# Patient Record
Sex: MALE | Race: WHITE | NOT HISPANIC OR LATINO | URBAN - METROPOLITAN AREA
[De-identification: names, ages, dates, MRNs, and addresses within clinical notes are randomized per-mention and may not be internally consistent; named-entity substitution may affect disease eponyms.]

---

## 2015-07-09 RX ORDER — INSULIN GLARGINE 100 [IU]/ML
60 INJECTION, SOLUTION SUBCUTANEOUS
Qty: 0 | Refills: 0 | COMMUNITY
Start: 2015-07-09

## 2019-03-08 ENCOUNTER — INPATIENT (INPATIENT)
Facility: HOSPITAL | Age: 74
LOS: 3 days | Discharge: HOME | DRG: 638 | End: 2019-03-12
Attending: STUDENT IN AN ORGANIZED HEALTH CARE EDUCATION/TRAINING PROGRAM | Admitting: STUDENT IN AN ORGANIZED HEALTH CARE EDUCATION/TRAINING PROGRAM
Payer: COMMERCIAL

## 2019-03-08 VITALS
HEIGHT: 72 IN | DIASTOLIC BLOOD PRESSURE: 80 MMHG | RESPIRATION RATE: 16 BRPM | SYSTOLIC BLOOD PRESSURE: 116 MMHG | WEIGHT: 300.05 LBS | HEART RATE: 82 BPM | TEMPERATURE: 98 F | OXYGEN SATURATION: 93 %

## 2019-03-08 RX ORDER — ONDANSETRON 8 MG/1
4 TABLET, FILM COATED ORAL ONCE
Qty: 0 | Refills: 0 | Status: COMPLETED | OUTPATIENT
Start: 2019-03-08 | End: 2019-03-08

## 2019-03-08 RX ORDER — SODIUM CHLORIDE 9 MG/ML
1000 INJECTION INTRAMUSCULAR; INTRAVENOUS; SUBCUTANEOUS ONCE
Qty: 0 | Refills: 0 | Status: COMPLETED | OUTPATIENT
Start: 2019-03-08 | End: 2019-03-08

## 2019-03-09 DIAGNOSIS — E11.65 TYPE 2 DIABETES MELLITUS WITH HYPERGLYCEMIA: ICD-10-CM

## 2019-03-09 DIAGNOSIS — Z29.9 ENCOUNTER FOR PROPHYLACTIC MEASURES, UNSPECIFIED: ICD-10-CM

## 2019-03-09 DIAGNOSIS — K21.9 GASTRO-ESOPHAGEAL REFLUX DISEASE WITHOUT ESOPHAGITIS: ICD-10-CM

## 2019-03-09 DIAGNOSIS — N17.9 ACUTE KIDNEY FAILURE, UNSPECIFIED: ICD-10-CM

## 2019-03-09 DIAGNOSIS — E11.43 TYPE 2 DIABETES MELLITUS WITH DIABETIC AUTONOMIC (POLY)NEUROPATHY: ICD-10-CM

## 2019-03-09 DIAGNOSIS — I10 ESSENTIAL (PRIMARY) HYPERTENSION: ICD-10-CM

## 2019-03-09 DIAGNOSIS — F03.90 UNSPECIFIED DEMENTIA WITHOUT BEHAVIORAL DISTURBANCE: ICD-10-CM

## 2019-03-09 DIAGNOSIS — I63.9 CEREBRAL INFARCTION, UNSPECIFIED: ICD-10-CM

## 2019-03-09 LAB
ALBUMIN SERPL ELPH-MCNC: 3.6 G/DL — SIGNIFICANT CHANGE UP (ref 3.5–5)
ALP SERPL-CCNC: 74 U/L — SIGNIFICANT CHANGE UP (ref 40–120)
ALT FLD-CCNC: 25 U/L DA — SIGNIFICANT CHANGE UP (ref 10–60)
ANION GAP SERPL CALC-SCNC: 7 MMOL/L — SIGNIFICANT CHANGE UP (ref 5–17)
ANION GAP SERPL CALC-SCNC: 9 MMOL/L — SIGNIFICANT CHANGE UP (ref 5–17)
APPEARANCE UR: CLEAR — SIGNIFICANT CHANGE UP
APTT BLD: 38 SEC — HIGH (ref 27.5–36.3)
AST SERPL-CCNC: 16 U/L — SIGNIFICANT CHANGE UP (ref 10–40)
BACTERIA # UR AUTO: ABNORMAL /HPF
BASOPHILS # BLD AUTO: 0.04 K/UL — SIGNIFICANT CHANGE UP (ref 0–0.2)
BASOPHILS NFR BLD AUTO: 0.6 % — SIGNIFICANT CHANGE UP (ref 0–2)
BILIRUB DIRECT SERPL-MCNC: 0.1 MG/DL — SIGNIFICANT CHANGE UP (ref 0–0.2)
BILIRUB INDIRECT FLD-MCNC: 0.4 MG/DL — SIGNIFICANT CHANGE UP (ref 0.2–1)
BILIRUB SERPL-MCNC: 0.5 MG/DL — SIGNIFICANT CHANGE UP (ref 0.2–1.2)
BILIRUB SERPL-MCNC: 0.5 MG/DL — SIGNIFICANT CHANGE UP (ref 0.2–1.2)
BILIRUB UR-MCNC: NEGATIVE — SIGNIFICANT CHANGE UP
BUN SERPL-MCNC: 38 MG/DL — HIGH (ref 7–18)
BUN SERPL-MCNC: 43 MG/DL — HIGH (ref 7–18)
CALCIUM SERPL-MCNC: 8.4 MG/DL — SIGNIFICANT CHANGE UP (ref 8.4–10.5)
CALCIUM SERPL-MCNC: 8.6 MG/DL — SIGNIFICANT CHANGE UP (ref 8.4–10.5)
CHLORIDE SERPL-SCNC: 103 MMOL/L — SIGNIFICANT CHANGE UP (ref 96–108)
CHLORIDE SERPL-SCNC: 111 MMOL/L — HIGH (ref 96–108)
CO2 SERPL-SCNC: 22 MMOL/L — SIGNIFICANT CHANGE UP (ref 22–31)
CO2 SERPL-SCNC: 23 MMOL/L — SIGNIFICANT CHANGE UP (ref 22–31)
COLOR SPEC: YELLOW — SIGNIFICANT CHANGE UP
CREAT SERPL-MCNC: 1.66 MG/DL — HIGH (ref 0.5–1.3)
CREAT SERPL-MCNC: 1.96 MG/DL — HIGH (ref 0.5–1.3)
DIFF PNL FLD: NEGATIVE — SIGNIFICANT CHANGE UP
EOSINOPHIL # BLD AUTO: 0.14 K/UL — SIGNIFICANT CHANGE UP (ref 0–0.5)
EOSINOPHIL NFR BLD AUTO: 2.1 % — SIGNIFICANT CHANGE UP (ref 0–6)
EPI CELLS # UR: SIGNIFICANT CHANGE UP /HPF
GLUCOSE SERPL-MCNC: 387 MG/DL — HIGH (ref 70–99)
GLUCOSE SERPL-MCNC: 580 MG/DL — CRITICAL HIGH (ref 70–99)
GLUCOSE UR QL: 1000 MG/DL
HCT VFR BLD CALC: 42.3 % — SIGNIFICANT CHANGE UP (ref 39–50)
HCV AB S/CO SERPL IA: 0.14 S/CO — SIGNIFICANT CHANGE UP (ref 0–0.79)
HCV AB SERPL-IMP: SIGNIFICANT CHANGE UP
HGB BLD-MCNC: 14.3 G/DL — SIGNIFICANT CHANGE UP (ref 13–17)
IMM GRANULOCYTES NFR BLD AUTO: 0.1 % — SIGNIFICANT CHANGE UP (ref 0–1.5)
INR BLD: 1.1 RATIO — SIGNIFICANT CHANGE UP (ref 0.88–1.16)
KETONES UR-MCNC: NEGATIVE — SIGNIFICANT CHANGE UP
LACTATE SERPL-SCNC: 1 MMOL/L — SIGNIFICANT CHANGE UP (ref 0.7–2)
LEUKOCYTE ESTERASE UR-ACNC: NEGATIVE — SIGNIFICANT CHANGE UP
LIDOCAIN IGE QN: 133 U/L — SIGNIFICANT CHANGE UP (ref 73–393)
LYMPHOCYTES # BLD AUTO: 2.02 K/UL — SIGNIFICANT CHANGE UP (ref 1–3.3)
LYMPHOCYTES # BLD AUTO: 30.3 % — SIGNIFICANT CHANGE UP (ref 13–44)
MAGNESIUM SERPL-MCNC: 2.5 MG/DL — SIGNIFICANT CHANGE UP (ref 1.6–2.6)
MCHC RBC-ENTMCNC: 27.7 PG — SIGNIFICANT CHANGE UP (ref 27–34)
MCHC RBC-ENTMCNC: 33.8 GM/DL — SIGNIFICANT CHANGE UP (ref 32–36)
MCV RBC AUTO: 81.8 FL — SIGNIFICANT CHANGE UP (ref 80–100)
MONOCYTES # BLD AUTO: 0.48 K/UL — SIGNIFICANT CHANGE UP (ref 0–0.9)
MONOCYTES NFR BLD AUTO: 7.2 % — SIGNIFICANT CHANGE UP (ref 2–14)
NEUTROPHILS # BLD AUTO: 3.98 K/UL — SIGNIFICANT CHANGE UP (ref 1.8–7.4)
NEUTROPHILS NFR BLD AUTO: 59.7 % — SIGNIFICANT CHANGE UP (ref 43–77)
NITRITE UR-MCNC: NEGATIVE — SIGNIFICANT CHANGE UP
NRBC # BLD: 0 /100 WBCS — SIGNIFICANT CHANGE UP (ref 0–0)
PH UR: 5 — SIGNIFICANT CHANGE UP (ref 5–8)
PHOSPHATE SERPL-MCNC: 3.2 MG/DL — SIGNIFICANT CHANGE UP (ref 2.5–4.5)
PLATELET # BLD AUTO: 162 K/UL — SIGNIFICANT CHANGE UP (ref 150–400)
POTASSIUM SERPL-MCNC: 3.8 MMOL/L — SIGNIFICANT CHANGE UP (ref 3.5–5.3)
POTASSIUM SERPL-MCNC: 4.1 MMOL/L — SIGNIFICANT CHANGE UP (ref 3.5–5.3)
POTASSIUM SERPL-SCNC: 3.8 MMOL/L — SIGNIFICANT CHANGE UP (ref 3.5–5.3)
POTASSIUM SERPL-SCNC: 4.1 MMOL/L — SIGNIFICANT CHANGE UP (ref 3.5–5.3)
PROT SERPL-MCNC: 7.6 G/DL — SIGNIFICANT CHANGE UP (ref 6–8.3)
PROT UR-MCNC: 15
PROTHROM AB SERPL-ACNC: 12.3 SEC — SIGNIFICANT CHANGE UP (ref 10–12.9)
RBC # BLD: 5.17 M/UL — SIGNIFICANT CHANGE UP (ref 4.2–5.8)
RBC # FLD: 13.3 % — SIGNIFICANT CHANGE UP (ref 10.3–14.5)
RBC CASTS # UR COMP ASSIST: SIGNIFICANT CHANGE UP /HPF (ref 0–2)
SODIUM SERPL-SCNC: 135 MMOL/L — SIGNIFICANT CHANGE UP (ref 135–145)
SODIUM SERPL-SCNC: 140 MMOL/L — SIGNIFICANT CHANGE UP (ref 135–145)
SP GR SPEC: 1.01 — SIGNIFICANT CHANGE UP (ref 1.01–1.02)
TROPONIN I SERPL-MCNC: <0.015 NG/ML — SIGNIFICANT CHANGE UP (ref 0–0.04)
UROBILINOGEN FLD QL: NEGATIVE — SIGNIFICANT CHANGE UP
WBC # BLD: 6.67 K/UL — SIGNIFICANT CHANGE UP (ref 3.8–10.5)
WBC # FLD AUTO: 6.67 K/UL — SIGNIFICANT CHANGE UP (ref 3.8–10.5)
WBC UR QL: SIGNIFICANT CHANGE UP /HPF (ref 0–5)

## 2019-03-09 PROCEDURE — 99285 EMERGENCY DEPT VISIT HI MDM: CPT

## 2019-03-09 PROCEDURE — 99223 1ST HOSP IP/OBS HIGH 75: CPT

## 2019-03-09 PROCEDURE — 74176 CT ABD & PELVIS W/O CONTRAST: CPT | Mod: 26

## 2019-03-09 RX ORDER — ATORVASTATIN CALCIUM 80 MG/1
40 TABLET, FILM COATED ORAL AT BEDTIME
Qty: 0 | Refills: 0 | Status: DISCONTINUED | OUTPATIENT
Start: 2019-03-09 | End: 2019-03-12

## 2019-03-09 RX ORDER — CARVEDILOL PHOSPHATE 80 MG/1
25 CAPSULE, EXTENDED RELEASE ORAL EVERY 12 HOURS
Qty: 0 | Refills: 0 | Status: DISCONTINUED | OUTPATIENT
Start: 2019-03-09 | End: 2019-03-12

## 2019-03-09 RX ORDER — METOCLOPRAMIDE HCL 10 MG
10 TABLET ORAL EVERY 6 HOURS
Qty: 0 | Refills: 0 | Status: DISCONTINUED | OUTPATIENT
Start: 2019-03-09 | End: 2019-03-12

## 2019-03-09 RX ORDER — DOCUSATE SODIUM 100 MG
100 CAPSULE ORAL
Qty: 0 | Refills: 0 | Status: DISCONTINUED | OUTPATIENT
Start: 2019-03-09 | End: 2019-03-12

## 2019-03-09 RX ORDER — SODIUM CHLORIDE 9 MG/ML
1000 INJECTION INTRAMUSCULAR; INTRAVENOUS; SUBCUTANEOUS ONCE
Qty: 0 | Refills: 0 | Status: COMPLETED | OUTPATIENT
Start: 2019-03-09 | End: 2019-03-09

## 2019-03-09 RX ORDER — POLYETHYLENE GLYCOL 3350 17 G/17G
17 POWDER, FOR SOLUTION ORAL DAILY
Qty: 0 | Refills: 0 | Status: COMPLETED | OUTPATIENT
Start: 2019-03-09 | End: 2019-03-10

## 2019-03-09 RX ORDER — QUETIAPINE FUMARATE 200 MG/1
50 TABLET, FILM COATED ORAL
Qty: 0 | Refills: 0 | Status: DISCONTINUED | OUTPATIENT
Start: 2019-03-09 | End: 2019-03-12

## 2019-03-09 RX ORDER — QUETIAPINE FUMARATE 200 MG/1
1 TABLET, FILM COATED ORAL
Qty: 0 | Refills: 0 | COMMUNITY

## 2019-03-09 RX ORDER — ASPIRIN/CALCIUM CARB/MAGNESIUM 324 MG
81 TABLET ORAL DAILY
Qty: 0 | Refills: 0 | Status: DISCONTINUED | OUTPATIENT
Start: 2019-03-09 | End: 2019-03-12

## 2019-03-09 RX ORDER — SENNA PLUS 8.6 MG/1
2 TABLET ORAL AT BEDTIME
Qty: 0 | Refills: 0 | Status: DISCONTINUED | OUTPATIENT
Start: 2019-03-09 | End: 2019-03-12

## 2019-03-09 RX ORDER — INSULIN LISPRO 100/ML
VIAL (ML) SUBCUTANEOUS EVERY 4 HOURS
Qty: 0 | Refills: 0 | Status: DISCONTINUED | OUTPATIENT
Start: 2019-03-09 | End: 2019-03-10

## 2019-03-09 RX ORDER — APIXABAN 2.5 MG/1
5 TABLET, FILM COATED ORAL EVERY 12 HOURS
Qty: 0 | Refills: 0 | Status: DISCONTINUED | OUTPATIENT
Start: 2019-03-09 | End: 2019-03-11

## 2019-03-09 RX ORDER — PANTOPRAZOLE SODIUM 20 MG/1
40 TABLET, DELAYED RELEASE ORAL
Qty: 0 | Refills: 0 | Status: DISCONTINUED | OUTPATIENT
Start: 2019-03-09 | End: 2019-03-12

## 2019-03-09 RX ORDER — INSULIN GLARGINE 100 [IU]/ML
20 INJECTION, SOLUTION SUBCUTANEOUS EVERY MORNING
Qty: 0 | Refills: 0 | Status: DISCONTINUED | OUTPATIENT
Start: 2019-03-09 | End: 2019-03-10

## 2019-03-09 RX ORDER — SODIUM CHLORIDE 9 MG/ML
1000 INJECTION INTRAMUSCULAR; INTRAVENOUS; SUBCUTANEOUS
Qty: 0 | Refills: 0 | Status: DISCONTINUED | OUTPATIENT
Start: 2019-03-09 | End: 2019-03-10

## 2019-03-09 RX ORDER — LEVOTHYROXINE SODIUM 125 MCG
175 TABLET ORAL DAILY
Qty: 0 | Refills: 0 | Status: DISCONTINUED | OUTPATIENT
Start: 2019-03-09 | End: 2019-03-12

## 2019-03-09 RX ADMIN — Medication 175 MICROGRAM(S): at 07:13

## 2019-03-09 RX ADMIN — APIXABAN 5 MILLIGRAM(S): 2.5 TABLET, FILM COATED ORAL at 17:50

## 2019-03-09 RX ADMIN — POLYETHYLENE GLYCOL 3350 17 GRAM(S): 17 POWDER, FOR SOLUTION ORAL at 11:13

## 2019-03-09 RX ADMIN — Medication 3: at 22:23

## 2019-03-09 RX ADMIN — QUETIAPINE FUMARATE 50 MILLIGRAM(S): 200 TABLET, FILM COATED ORAL at 17:50

## 2019-03-09 RX ADMIN — SODIUM CHLORIDE 1000 MILLILITER(S): 9 INJECTION INTRAMUSCULAR; INTRAVENOUS; SUBCUTANEOUS at 01:49

## 2019-03-09 RX ADMIN — Medication 6: at 11:06

## 2019-03-09 RX ADMIN — Medication 2: at 17:58

## 2019-03-09 RX ADMIN — SODIUM CHLORIDE 4000 MILLILITER(S): 9 INJECTION INTRAMUSCULAR; INTRAVENOUS; SUBCUTANEOUS at 02:34

## 2019-03-09 RX ADMIN — Medication 4: at 15:04

## 2019-03-09 RX ADMIN — SODIUM CHLORIDE 100 MILLILITER(S): 9 INJECTION INTRAMUSCULAR; INTRAVENOUS; SUBCUTANEOUS at 06:44

## 2019-03-09 RX ADMIN — Medication 100 MILLIGRAM(S): at 17:50

## 2019-03-09 RX ADMIN — ATORVASTATIN CALCIUM 40 MILLIGRAM(S): 80 TABLET, FILM COATED ORAL at 22:23

## 2019-03-09 RX ADMIN — INSULIN GLARGINE 20 UNIT(S): 100 INJECTION, SOLUTION SUBCUTANEOUS at 07:01

## 2019-03-09 RX ADMIN — SENNA PLUS 2 TABLET(S): 8.6 TABLET ORAL at 22:23

## 2019-03-09 RX ADMIN — Medication 81 MILLIGRAM(S): at 11:10

## 2019-03-09 RX ADMIN — Medication 10 MILLIGRAM(S): at 11:13

## 2019-03-09 RX ADMIN — Medication 5: at 06:01

## 2019-03-09 RX ADMIN — CARVEDILOL PHOSPHATE 25 MILLIGRAM(S): 80 CAPSULE, EXTENDED RELEASE ORAL at 17:50

## 2019-03-09 RX ADMIN — SODIUM CHLORIDE 4000 MILLILITER(S): 9 INJECTION INTRAMUSCULAR; INTRAVENOUS; SUBCUTANEOUS at 00:10

## 2019-03-09 RX ADMIN — SODIUM CHLORIDE 100 MILLILITER(S): 9 INJECTION INTRAMUSCULAR; INTRAVENOUS; SUBCUTANEOUS at 11:14

## 2019-03-09 RX ADMIN — ONDANSETRON 4 MILLIGRAM(S): 8 TABLET, FILM COATED ORAL at 00:39

## 2019-03-09 RX ADMIN — PANTOPRAZOLE SODIUM 40 MILLIGRAM(S): 20 TABLET, DELAYED RELEASE ORAL at 07:00

## 2019-03-09 NOTE — H&P ADULT - PROBLEM SELECTOR PLAN 8
Improve VTE score: 2 (Eliquis bid)  [ ] Previous VTE                                               3  [ ] Thrombophilia                                             2  [ ] Lower limb paralysis                                   2    [ ] Current Cancer                                           2   [x] Immobilization > 24 hrs                              1  [ ] ICU/CCU stay > 24 hours                            1  [x] Age > 60                                                       1

## 2019-03-09 NOTE — H&P ADULT - PROBLEM SELECTOR PLAN 1
Intractable nausea and vomiting for 2 wks   CT Abdomen: No acute pathology   Keep NPO  Started IV metoclopramide

## 2019-03-09 NOTE — H&P ADULT - NSHPPHYSICALEXAM_GEN_ALL_CORE
Vital Signs Last 24 Hrs  T(C): 36.6 (09 Mar 2019 06:14), Max: 36.7 (08 Mar 2019 23:08)  T(F): 97.8 (09 Mar 2019 06:14), Max: 98.1 (08 Mar 2019 23:08)  HR: 61 (09 Mar 2019 06:14) (61 - 82)  BP: 139/66 (09 Mar 2019 06:14) (116/80 - 139/66)  RR: 17 (09 Mar 2019 06:14) (16 - 17)  SpO2: 97% (09 Mar 2019 06:14) (93% - 97%)  .  GENERAL: Well developed, Well nourished obese [place] male, NAD  HEENT:  Normocephalic/Atraumatic, reactive light reflex, moist mucous membranes  NECK: Supple, no JVD  RESP: Symmetric movement of the chest, clear to auscultation bilaterally  CVS: S1 and S2 audible, no murmur, rubs or gallops noted  GI: Normal active bowel sounds present, abdomen soft, non tender  EXTREMITIES:  No edema, no clubbing, cyanosis  MSK: 3/5 strength bilateral upper and lower extremities, intact sensations to light & crude touch  PSYCH: Rude and arrogant     NEURO: Alert and oriented x 1

## 2019-03-09 NOTE — H&P ADULT - PROBLEM SELECTOR PLAN 2
Pt takes sometimes 40, sometimes 60 lantus at home  Non-compliant with insulin regimen and frequent finger sticks monitoring  Started HSS and Lantus 20  IV hydration

## 2019-03-09 NOTE — ED PROVIDER NOTE - OBJECTIVE STATEMENT
72 y/o M pt with a PMHx of HTN, HLD, YVROSE, mild Dementia, presents to the ED with complaints of 1-2 weeks of nausea and vomiting. Family has encouraged patient to come to the ED for further evaluation but patient previously refused. Patient states today he was slightly more confused from his baseline so his wife called EMS. Patient currently has vague complaints of abdominal pain but denies other complaints. Patient notes his last bowel movement was yesterday and he has no hx of abdominal surgery.

## 2019-03-09 NOTE — H&P ADULT - HISTORY OF PRESENT ILLNESS
73 Male with PMH of CVA x 3 (2014, 2015, 2016 on Elequis), Dementia, HTN, DM, GERD, Thyroidectomy leading to hypothyroidism came to hospital for nausea, vomiting and diffuse abdominal pain of 2 weeks. Pt is poor historian and is AO x 1. By using  he says he feels fine and he had abdominal pain previously. Information is taken from wife over the phone ([phone]). Wife has been  to him for 50 years and says it very difficult to deal with patient. he is very stubborn and doesn't let anyone take care of him. he adminiters is own insulin however he wants. Family has been asking him for 2 weeks to go to hospital for evaluation for his abdominal pain and intractable vomiting which he has been refusing. He doesn't want to see doctor and doesn't listen to the kids as well. However, he is complaint with his meds and sees his PMD regularly as per wife. Wife denied fever, chest pain, increased urination, diarrhea or sick contacts at home.     SH: Lives with wife, Quit binge drinking several years ago, Retired     ED Course: Hemodynamically stable. Labs showed finger stick of 600 and BETTY of 1.96 without anion gap acidosis. EKG showed normal sinus @ 74 bpm and Ct abdomen showed no acute pathology. Pt was given 2 liter bolus and zofran. 73 Male with PMH of CVA x 3 (2014, 2015, 2016 on Elequis), Dementia, HTN, DM, GERD, Thyroidectomy leading to hypothyroidism came to hospital for nausea, vomiting and diffuse abdominal pain of 2 weeks. Pt is poor historian and is AO x 1. By using  he says he feels fine and he had abdominal pain previously. Information is taken from wife over the phone ([phone]). Wife has been  to him for 50 years and says it very difficult to deal with patient. He is very stubborn and doesn't let anyone take care of him. He adminiters is own insulin however he wants. Family has been asking him for 2 weeks to go to hospital for evaluation for his abdominal pain and intractable vomiting which he has been refusing. He doesn't want to see doctor and doesn't listen to the kids as well. However, he is complaint with his meds and sees his PMD regularly as per wife. Wife denied fever, chest pain, increased urination, diarrhea or sick contacts at home.     SH: Lives with wife, Quit binge drinking several years ago, Retired     ED Course: Hemodynamically stable. Labs showed finger stick of 600 and BETTY of 1.96 without anion gap acidosis. EKG showed normal sinus @ 74 bpm and Ct abdomen showed no acute pathology. Pt was given 2 liter bolus and zofran.

## 2019-03-09 NOTE — H&P ADULT - ATTENDING COMMENTS
Patient seen and examined ; case was discussed with the admitting resident    ROS: as in the HPI; all other ROS negative    SH and family history as above    Vital Signs Last 24 Hrs  T(C): 36.6 (09 Mar 2019 06:14), Max: 36.7 (08 Mar 2019 23:08)  T(F): 97.8 (09 Mar 2019 06:14), Max: 98.1 (08 Mar 2019 23:08)  HR: 61 (09 Mar 2019 06:14) (61 - 82)  BP: 139/66 (09 Mar 2019 06:14) (116/80 - 139/66)  BP(mean): --  RR: 17 (09 Mar 2019 06:14) (16 - 17)  SpO2: 97% (09 Mar 2019 06:14) (93% - 97%)    GEN: NAD  HEENT- normocephalic; mouth moist  CVS- S1S2+  LUNGS- clear to auscultation; no wheezing  ABD: Soft , nontender, nondistended, Bowel sounds are present  EXTREMITY: no calf tenderness, no cyanosis, no edema  NEURO: AAOx1 to person only; non focal neurologic exam; cranial nerves grossly intact.  PSYCH: blunted affect  BACK: no swelling or mass;   VASCULAR: ++ distal peripheral pulses  SKIN: warm and dry.     Labs Reviewed:                         14.3   6.67  )-----------( 162      ( 09 Mar 2019 00:59 )             42.3     03-09    140  |  111<H>  |  38<H>  ----------------------------<  387<H>  3.8   |  22  |  1.66<H>    Ca    8.4      09 Mar 2019 05:54  Phos  3.2     03-09  Mg     2.5     03-09    TPro  7.6  /  Alb  3.6  /  TBili  0.5  /  DBili  0.1  /  AST  16  /  ALT  25  /  AlkPhos  74  03-09    CARDIAC MARKERS ( 09 Mar 2019 00:59 )  <0.015 ng/mL / x     / x     / x     / x        Urinalysis Basic - ( 09 Mar 2019 02:09 )  Color: Yellow / Appearance: Clear / S.010 / pH: x  Gluc: x / Ketone: Negative  / Bili: Negative / Urobili: Negative   Blood: x / Protein: 15 / Nitrite: Negative   Leuk Esterase: Negative / RBC: 0-2 /HPF / WBC 0-2 /HPF   Sq Epi: x / Non Sq Epi: Few /HPF / Bacteria: Moderate /HPF    PT/INR - ( 09 Mar 2019 00:59 )   PT: 12.3 sec;   INR: 1.10 ratio      PTT - ( 09 Mar 2019 00:59 )  PTT:38.0 sec  BNP:   MEDICATIONS  (STANDING):  apixaban 5 milliGRAM(s) Oral every 12 hours  aspirin  chewable 81 milliGRAM(s) Oral daily  atorvastatin 40 milliGRAM(s) Oral at bedtime  carvedilol 25 milliGRAM(s) Oral every 12 hours  docusate sodium 100 milliGRAM(s) Oral two times a day  insulin glargine Injectable (LANTUS) 20 Unit(s) SubCutaneous every morning  insulin lispro (HumaLOG) corrective regimen sliding scale   SubCutaneous every 4 hours  levothyroxine 175 MICROGram(s) Oral daily  pantoprazole    Tablet 40 milliGRAM(s) Oral before breakfast  PARoxetine 10 milliGRAM(s) Oral daily  polyethylene glycol 3350 17 Gram(s) Oral daily  QUEtiapine 50 milliGRAM(s) Oral two times a day  senna 2 Tablet(s) Oral at bedtime  sodium chloride 0.9%. 1000 milliLiter(s) (100 mL/Hr) IV Continuous <Continuous>    MEDICATIONS  (PRN):  metoclopramide Injectable 10 milliGRAM(s) IV Push every 6 hours PRN vomiting  CT abd reviewed   EKG Reviewed- inf/lat q waves    74 y/o M with vascular dementia s/p CVA x3 on Eliquis, DMII on insulin admitted with intractable N/V, dehydration. Patient is unable to provide hx even with  assistance.    1.Uncontrolled hyperglycemia- 2/2 medication noncompliance corroborated with wife over phone. No anion gap, responded well to IVF alone, will restart basal insulin and recheck FS q4 while NPO, would advance diet today if patient feeling better. Check A1C.   2. BETTY- likely 2/2 volume depletion, repeat improved, baseline SCr unknown.  3.Dehydration- 2/2 GI losses, osmotic diuresis   4. Abd pain- resolving, CT without acute findings except adrenal adenoma and stool burden, start bowel regimen.   5. Adrenal adenoma- rec outpatient follow up    Plan of care discussed with patient ;  all questions and concerns were addressed.

## 2019-03-09 NOTE — ED PROVIDER NOTE - PROGRESS NOTE DETAILS
labs - hyperglycemia; pre renal; no evidence of DKA  UA - no UTI   EKG - nsr, rate 74, , QRs 92, QTc 468, anterior and inferior Q waves   CT abd - no obstruction  Pt will with nausea and hyperglycemia. Will admit for poorly controlled DM and inability to tolerate PO. Pt's PCP is Dr More who admits to hospitalist; endorsed to Dr William and MAR.

## 2019-03-09 NOTE — ED ADULT NURSE NOTE - ED STAT RN HANDOFF DETAILS
pt.remianed  stable.denies pain. transfer to holding area.report given to alma franklin.not in distress

## 2019-03-09 NOTE — H&P ADULT - PMH
Diabetes mellitus    H/O hyperlipidemia    H/O: HTN (hypertension)    History of gastroesophageal reflux (GERD)    History of skin cancer  removed from scalp  History of sleep apnea    History of thyroid cancer    Lumbar spinal stenosis    Lung nodule  As per CT scan of chest calcified granuloma  Stroke  since january

## 2019-03-09 NOTE — H&P ADULT - ASSESSMENT
73 Male with PMH of CVA x 3 (2014, 2015, 2016 on Elequis), Dementia, HTN, DM, GERD, Thyroidectomy leading to hypothyroidism came to hospital for nausea, vomiting and diffuse abdominal pain of 2 weeks. Admitted to medicine for uncontrolled diabetes and gastroparesis.

## 2019-03-09 NOTE — ED PROVIDER NOTE - PHYSICAL EXAMINATION
GENERAL: wells appearing, no acute distress   HEAD: atraumatic   EYES: EOMI, pink conjunctiva   ENT: dry oral mucosa   CARDIAC: RRR, no edema, distal pulses present   RESPIRATORY: lungs CTAB, no increased work of breathing   GASTROINTESTINAL: no abdominal tenderness, no rebound or guarding, bowel sounds presents, obese abdomen with distention ? (family states abdomen looks normal for patient)   GENITOURINARY: no CVA tenderness   MUSCULOSKELETAL: no deformity   NEUROLOGICAL: AAOx3, CN's II-XII intact, strength 5/5 bilateral UE and LE, sensation intact to light touch, finger to nose intact, steady gait  SKIN: intact   PSYCHIATRIC: cooperative  HEME LYMPH: no lymphadenopathy

## 2019-03-09 NOTE — ED PROVIDER NOTE - CLINICAL SUMMARY MEDICAL DECISION MAKING FREE TEXT BOX
74 y/o M pt presents with multiple episodes of vomiting and abdominal pain. Finger stick is "high" in triage. Will provide fluids, Zofran, labs and urine to r/o DKA. Will obtain CT abdomen and admit.

## 2019-03-10 LAB
ALBUMIN SERPL ELPH-MCNC: 3.1 G/DL — LOW (ref 3.5–5)
ALP SERPL-CCNC: 59 U/L — SIGNIFICANT CHANGE UP (ref 40–120)
ALT FLD-CCNC: 21 U/L DA — SIGNIFICANT CHANGE UP (ref 10–60)
ANION GAP SERPL CALC-SCNC: 6 MMOL/L — SIGNIFICANT CHANGE UP (ref 5–17)
AST SERPL-CCNC: 13 U/L — SIGNIFICANT CHANGE UP (ref 10–40)
BASOPHILS # BLD AUTO: 0.04 K/UL — SIGNIFICANT CHANGE UP (ref 0–0.2)
BASOPHILS NFR BLD AUTO: 0.6 % — SIGNIFICANT CHANGE UP (ref 0–2)
BILIRUB SERPL-MCNC: 0.6 MG/DL — SIGNIFICANT CHANGE UP (ref 0.2–1.2)
BUN SERPL-MCNC: 27 MG/DL — HIGH (ref 7–18)
CALCIUM SERPL-MCNC: 8.6 MG/DL — SIGNIFICANT CHANGE UP (ref 8.4–10.5)
CHLORIDE SERPL-SCNC: 109 MMOL/L — HIGH (ref 96–108)
CHOLEST SERPL-MCNC: 129 MG/DL — SIGNIFICANT CHANGE UP (ref 10–199)
CO2 SERPL-SCNC: 26 MMOL/L — SIGNIFICANT CHANGE UP (ref 22–31)
CREAT SERPL-MCNC: 1.45 MG/DL — HIGH (ref 0.5–1.3)
CULTURE RESULTS: SIGNIFICANT CHANGE UP
EOSINOPHIL # BLD AUTO: 0.22 K/UL — SIGNIFICANT CHANGE UP (ref 0–0.5)
EOSINOPHIL NFR BLD AUTO: 3.5 % — SIGNIFICANT CHANGE UP (ref 0–6)
GLUCOSE SERPL-MCNC: 215 MG/DL — HIGH (ref 70–99)
HBA1C BLD-MCNC: 15.4 % — HIGH (ref 4–5.6)
HCT VFR BLD CALC: 39.8 % — SIGNIFICANT CHANGE UP (ref 39–50)
HDLC SERPL-MCNC: 35 MG/DL — LOW
HGB BLD-MCNC: 13.2 G/DL — SIGNIFICANT CHANGE UP (ref 13–17)
IMM GRANULOCYTES NFR BLD AUTO: 0.5 % — SIGNIFICANT CHANGE UP (ref 0–1.5)
LIPID PNL WITH DIRECT LDL SERPL: 67 MG/DL — SIGNIFICANT CHANGE UP
LYMPHOCYTES # BLD AUTO: 2.62 K/UL — SIGNIFICANT CHANGE UP (ref 1–3.3)
LYMPHOCYTES # BLD AUTO: 41.5 % — SIGNIFICANT CHANGE UP (ref 13–44)
MAGNESIUM SERPL-MCNC: 2.3 MG/DL — SIGNIFICANT CHANGE UP (ref 1.6–2.6)
MCHC RBC-ENTMCNC: 27.6 PG — SIGNIFICANT CHANGE UP (ref 27–34)
MCHC RBC-ENTMCNC: 33.2 GM/DL — SIGNIFICANT CHANGE UP (ref 32–36)
MCV RBC AUTO: 83.3 FL — SIGNIFICANT CHANGE UP (ref 80–100)
MONOCYTES # BLD AUTO: 0.53 K/UL — SIGNIFICANT CHANGE UP (ref 0–0.9)
MONOCYTES NFR BLD AUTO: 8.4 % — SIGNIFICANT CHANGE UP (ref 2–14)
NEUTROPHILS # BLD AUTO: 2.87 K/UL — SIGNIFICANT CHANGE UP (ref 1.8–7.4)
NEUTROPHILS NFR BLD AUTO: 45.5 % — SIGNIFICANT CHANGE UP (ref 43–77)
NRBC # BLD: 0 /100 WBCS — SIGNIFICANT CHANGE UP (ref 0–0)
PHOSPHATE SERPL-MCNC: 3.4 MG/DL — SIGNIFICANT CHANGE UP (ref 2.5–4.5)
PLATELET # BLD AUTO: 150 K/UL — SIGNIFICANT CHANGE UP (ref 150–400)
POTASSIUM SERPL-MCNC: 3.4 MMOL/L — LOW (ref 3.5–5.3)
POTASSIUM SERPL-SCNC: 3.4 MMOL/L — LOW (ref 3.5–5.3)
PROT SERPL-MCNC: 6.5 G/DL — SIGNIFICANT CHANGE UP (ref 6–8.3)
RBC # BLD: 4.78 M/UL — SIGNIFICANT CHANGE UP (ref 4.2–5.8)
RBC # FLD: 13.4 % — SIGNIFICANT CHANGE UP (ref 10.3–14.5)
SODIUM SERPL-SCNC: 141 MMOL/L — SIGNIFICANT CHANGE UP (ref 135–145)
SPECIMEN SOURCE: SIGNIFICANT CHANGE UP
T4 AB SER-ACNC: 4 UG/DL — LOW (ref 4.6–12)
TOTAL CHOLESTEROL/HDL RATIO MEASUREMENT: 3.7 RATIO — SIGNIFICANT CHANGE UP (ref 3.4–9.6)
TRIGL SERPL-MCNC: 135 MG/DL — SIGNIFICANT CHANGE UP (ref 10–149)
TSH SERPL-MCNC: 2.67 UU/ML — SIGNIFICANT CHANGE UP (ref 0.34–4.82)
VIT B12 SERPL-MCNC: 625 PG/ML — SIGNIFICANT CHANGE UP (ref 232–1245)
WBC # BLD: 6.31 K/UL — SIGNIFICANT CHANGE UP (ref 3.8–10.5)
WBC # FLD AUTO: 6.31 K/UL — SIGNIFICANT CHANGE UP (ref 3.8–10.5)

## 2019-03-10 PROCEDURE — 99233 SBSQ HOSP IP/OBS HIGH 50: CPT | Mod: GC

## 2019-03-10 RX ORDER — SODIUM CHLORIDE 9 MG/ML
1000 INJECTION INTRAMUSCULAR; INTRAVENOUS; SUBCUTANEOUS
Qty: 0 | Refills: 0 | Status: DISCONTINUED | OUTPATIENT
Start: 2019-03-10 | End: 2019-03-10

## 2019-03-10 RX ORDER — SODIUM CHLORIDE 9 MG/ML
1000 INJECTION INTRAMUSCULAR; INTRAVENOUS; SUBCUTANEOUS
Qty: 0 | Refills: 0 | Status: DISCONTINUED | OUTPATIENT
Start: 2019-03-10 | End: 2019-03-12

## 2019-03-10 RX ORDER — DEXTROSE 50 % IN WATER 50 %
25 SYRINGE (ML) INTRAVENOUS ONCE
Qty: 0 | Refills: 0 | Status: DISCONTINUED | OUTPATIENT
Start: 2019-03-10 | End: 2019-03-12

## 2019-03-10 RX ORDER — SODIUM CHLORIDE 9 MG/ML
1000 INJECTION, SOLUTION INTRAVENOUS
Qty: 0 | Refills: 0 | Status: DISCONTINUED | OUTPATIENT
Start: 2019-03-10 | End: 2019-03-12

## 2019-03-10 RX ORDER — INSULIN LISPRO 100/ML
10 VIAL (ML) SUBCUTANEOUS
Qty: 0 | Refills: 0 | Status: DISCONTINUED | OUTPATIENT
Start: 2019-03-10 | End: 2019-03-12

## 2019-03-10 RX ORDER — DEXTROSE 50 % IN WATER 50 %
12.5 SYRINGE (ML) INTRAVENOUS ONCE
Qty: 0 | Refills: 0 | Status: DISCONTINUED | OUTPATIENT
Start: 2019-03-10 | End: 2019-03-12

## 2019-03-10 RX ORDER — DEXTROSE 50 % IN WATER 50 %
15 SYRINGE (ML) INTRAVENOUS ONCE
Qty: 0 | Refills: 0 | Status: DISCONTINUED | OUTPATIENT
Start: 2019-03-10 | End: 2019-03-12

## 2019-03-10 RX ORDER — INSULIN GLARGINE 100 [IU]/ML
30 INJECTION, SOLUTION SUBCUTANEOUS AT BEDTIME
Qty: 0 | Refills: 0 | Status: DISCONTINUED | OUTPATIENT
Start: 2019-03-10 | End: 2019-03-12

## 2019-03-10 RX ORDER — POTASSIUM CHLORIDE 20 MEQ
20 PACKET (EA) ORAL ONCE
Qty: 0 | Refills: 0 | Status: COMPLETED | OUTPATIENT
Start: 2019-03-10 | End: 2019-03-10

## 2019-03-10 RX ORDER — INSULIN LISPRO 100/ML
VIAL (ML) SUBCUTANEOUS
Qty: 0 | Refills: 0 | Status: DISCONTINUED | OUTPATIENT
Start: 2019-03-10 | End: 2019-03-12

## 2019-03-10 RX ORDER — GLUCAGON INJECTION, SOLUTION 0.5 MG/.1ML
1 INJECTION, SOLUTION SUBCUTANEOUS ONCE
Qty: 0 | Refills: 0 | Status: DISCONTINUED | OUTPATIENT
Start: 2019-03-10 | End: 2019-03-12

## 2019-03-10 RX ORDER — INSULIN LISPRO 100/ML
4 VIAL (ML) SUBCUTANEOUS
Qty: 0 | Refills: 0 | Status: DISCONTINUED | OUTPATIENT
Start: 2019-03-10 | End: 2019-03-10

## 2019-03-10 RX ADMIN — Medication 2: at 08:43

## 2019-03-10 RX ADMIN — Medication 5: at 13:41

## 2019-03-10 RX ADMIN — Medication 3: at 17:01

## 2019-03-10 RX ADMIN — QUETIAPINE FUMARATE 50 MILLIGRAM(S): 200 TABLET, FILM COATED ORAL at 17:02

## 2019-03-10 RX ADMIN — ATORVASTATIN CALCIUM 40 MILLIGRAM(S): 80 TABLET, FILM COATED ORAL at 21:42

## 2019-03-10 RX ADMIN — SODIUM CHLORIDE 100 MILLILITER(S): 9 INJECTION INTRAMUSCULAR; INTRAVENOUS; SUBCUTANEOUS at 11:10

## 2019-03-10 RX ADMIN — Medication 4: at 11:47

## 2019-03-10 RX ADMIN — PANTOPRAZOLE SODIUM 40 MILLIGRAM(S): 20 TABLET, DELAYED RELEASE ORAL at 07:30

## 2019-03-10 RX ADMIN — Medication 2: at 21:44

## 2019-03-10 RX ADMIN — QUETIAPINE FUMARATE 50 MILLIGRAM(S): 200 TABLET, FILM COATED ORAL at 07:31

## 2019-03-10 RX ADMIN — Medication 175 MICROGRAM(S): at 07:33

## 2019-03-10 RX ADMIN — Medication 2: at 03:20

## 2019-03-10 RX ADMIN — Medication 20 MILLIEQUIVALENT(S): at 09:21

## 2019-03-10 RX ADMIN — Medication 10 UNIT(S): at 17:02

## 2019-03-10 RX ADMIN — CARVEDILOL PHOSPHATE 25 MILLIGRAM(S): 80 CAPSULE, EXTENDED RELEASE ORAL at 17:03

## 2019-03-10 RX ADMIN — Medication 100 MILLIGRAM(S): at 07:32

## 2019-03-10 RX ADMIN — SENNA PLUS 2 TABLET(S): 8.6 TABLET ORAL at 21:42

## 2019-03-10 RX ADMIN — INSULIN GLARGINE 20 UNIT(S): 100 INJECTION, SOLUTION SUBCUTANEOUS at 08:44

## 2019-03-10 RX ADMIN — APIXABAN 5 MILLIGRAM(S): 2.5 TABLET, FILM COATED ORAL at 17:02

## 2019-03-10 RX ADMIN — CARVEDILOL PHOSPHATE 25 MILLIGRAM(S): 80 CAPSULE, EXTENDED RELEASE ORAL at 07:31

## 2019-03-10 RX ADMIN — Medication 4 UNIT(S): at 11:48

## 2019-03-10 RX ADMIN — APIXABAN 5 MILLIGRAM(S): 2.5 TABLET, FILM COATED ORAL at 07:30

## 2019-03-10 RX ADMIN — INSULIN GLARGINE 30 UNIT(S): 100 INJECTION, SOLUTION SUBCUTANEOUS at 21:42

## 2019-03-10 RX ADMIN — Medication 100 MILLIGRAM(S): at 17:02

## 2019-03-10 NOTE — PROGRESS NOTE ADULT - PROBLEM SELECTOR PLAN 5
Pt takes Coreg, olmesartan, eplerenone at home  Holding olmesartan and eplerenone due to BETTY  on coreg

## 2019-03-10 NOTE — PROGRESS NOTE ADULT - PROBLEM SELECTOR PLAN 2
Pt takes sometimes 40, sometimes 60 lantus at home  Non-compliant with insulin regimen and frequent finger sticks monitoring  on HSS and Lantus 20, humalog 4 tid  IV hydration

## 2019-03-10 NOTE — PROGRESS NOTE ADULT - PROBLEM SELECTOR PLAN 1
resolved  p/w Intractable nausea and vomiting for 2 wks   CT Abdomen: No acute pathology   on full liquid  on IV metoclopramide and protonix resolved  p/w Intractable nausea and vomiting for 2 wks   CT Abdomen: No acute pathology   advanced diet to regular  on IV metoclopramide and protonix

## 2019-03-10 NOTE — PROGRESS NOTE ADULT - SUBJECTIVE AND OBJECTIVE BOX
PGY 1 Note discussed with supervising resident and primary attending    Patient is a 73y old  Male who presents with a chief complaint of Abdominal pain and vomiting (09 Mar 2019 06:14)      INTERVAL HPI/OVERNIGHT EVENTS: offers no new complaints; current symptoms resolving, no abdominal pain/vomiting, tolerating diet well    MEDICATIONS  (STANDING):  apixaban 5 milliGRAM(s) Oral every 12 hours  aspirin  chewable 81 milliGRAM(s) Oral daily  atorvastatin 40 milliGRAM(s) Oral at bedtime  carvedilol 25 milliGRAM(s) Oral every 12 hours  docusate sodium 100 milliGRAM(s) Oral two times a day  insulin glargine Injectable (LANTUS) 20 Unit(s) SubCutaneous every morning  insulin lispro (HumaLOG) corrective regimen sliding scale   SubCutaneous every 4 hours  insulin lispro Injectable (HumaLOG) 4 Unit(s) SubCutaneous three times a day before meals  levothyroxine 175 MICROGram(s) Oral daily  pantoprazole    Tablet 40 milliGRAM(s) Oral before breakfast  PARoxetine 10 milliGRAM(s) Oral daily  polyethylene glycol 3350 17 Gram(s) Oral daily  QUEtiapine 50 milliGRAM(s) Oral two times a day  senna 2 Tablet(s) Oral at bedtime  sodium chloride 0.9%. 1000 milliLiter(s) (100 mL/Hr) IV Continuous <Continuous>    MEDICATIONS  (PRN):  metoclopramide Injectable 10 milliGRAM(s) IV Push every 6 hours PRN vomiting      __________________________________________________  REVIEW OF SYSTEMS:    CONSTITUTIONAL: No fever,   EYES: no acute visual disturbances  NECK: No pain or stiffness  RESPIRATORY: No cough; No shortness of breath  CARDIOVASCULAR: No chest pain, no palpitations  GASTROINTESTINAL: No pain. No nausea or vomiting; No diarrhea   NEUROLOGICAL: No headache or numbness, no tremors  MUSCULOSKELETAL: No joint pain, no muscle pain  GENITOURINARY: no dysuria, no frequency, no hesitancy  PSYCHIATRY: no depression , no anxiety  ALL OTHER  ROS negative        Vital Signs Last 24 Hrs  T(C): 36.3 (10 Mar 2019 08:13), Max: 36.7 (09 Mar 2019 16:00)  T(F): 97.3 (10 Mar 2019 08:13), Max: 98.1 (09 Mar 2019 16:00)  HR: 66 (10 Mar 2019 08:13) (58 - 76)  BP: 171/91 (10 Mar 2019 08:13) (122/95 - 171/91)  BP(mean): --  RR: 18 (10 Mar 2019 08:13) (16 - 20)  SpO2: 97% (10 Mar 2019 08:13) (94% - 98%)    ________________________________________________  PHYSICAL EXAM:  GENERAL: NAD  HEENT: Normocephalic;  conjunctivae and sclerae clear; moist mucous membranes;   NECK : supple  CHEST/LUNG: Clear to auscultation bilaterally with good air entry   HEART: S1 S2  regular; no murmurs, gallops or rubs  ABDOMEN: Soft, Nontender, Nondistended; Bowel sounds present  EXTREMITIES: no cyanosis; no edema; no calf tenderness  SKIN: warm and dry; no rash  NERVOUS SYSTEM:  Awake and alert; Oriented  to place, person and time ; no new deficits    _________________________________________________  LABS:                        13.2   6.31  )-----------( 150      ( 10 Mar 2019 06:34 )             39.8     03-10    141  |  109<H>  |  27<H>  ----------------------------<  215<H>  3.4<L>   |  26  |  1.45<H>    Ca    8.6      10 Mar 2019 06:34  Phos  3.4     03-10  Mg     2.3     03-10    TPro  6.5  /  Alb  3.1<L>  /  TBili  0.6  /  DBili  x   /  AST  13  /  ALT  21  /  AlkPhos  59  03-10    PT/INR - ( 09 Mar 2019 00:59 )   PT: 12.3 sec;   INR: 1.10 ratio         PTT - ( 09 Mar 2019 00:59 )  PTT:38.0 sec  Urinalysis Basic - ( 09 Mar 2019 02:09 )    Color: Yellow / Appearance: Clear / S.010 / pH: x  Gluc: x / Ketone: Negative  / Bili: Negative / Urobili: Negative   Blood: x / Protein: 15 / Nitrite: Negative   Leuk Esterase: Negative / RBC: 0-2 /HPF / WBC 0-2 /HPF   Sq Epi: x / Non Sq Epi: Few /HPF / Bacteria: Moderate /HPF      CAPILLARY BLOOD GLUCOSE      POCT Blood Glucose.: 225 mg/dL (10 Mar 2019 08:33)  POCT Blood Glucose.: 249 mg/dL (10 Mar 2019 03:14)  POCT Blood Glucose.: 299 mg/dL (09 Mar 2019 22:17)  POCT Blood Glucose.: 235 mg/dL (09 Mar 2019 17:56)  POCT Blood Glucose.: 344 mg/dL (09 Mar 2019 14:44)  POCT Blood Glucose.: 441 mg/dL (09 Mar 2019 10:54)        RADIOLOGY & ADDITIONAL TESTS:    Imaging Personally Reviewed:   YES    Consultant(s) Notes Reviewed:   YES    Care Discussed with Consultants :  YES  Plan of care was discussed with patient and /or primary care giver; all questions and concerns were addressed and care was aligned with patient's wishes.

## 2019-03-11 DIAGNOSIS — R10.9 UNSPECIFIED ABDOMINAL PAIN: ICD-10-CM

## 2019-03-11 LAB
ALBUMIN SERPL ELPH-MCNC: 3.3 G/DL — LOW (ref 3.5–5)
ALP SERPL-CCNC: 61 U/L — SIGNIFICANT CHANGE UP (ref 40–120)
ALT FLD-CCNC: 24 U/L DA — SIGNIFICANT CHANGE UP (ref 10–60)
ANION GAP SERPL CALC-SCNC: 6 MMOL/L — SIGNIFICANT CHANGE UP (ref 5–17)
AST SERPL-CCNC: 15 U/L — SIGNIFICANT CHANGE UP (ref 10–40)
BASOPHILS # BLD AUTO: 0.05 K/UL — SIGNIFICANT CHANGE UP (ref 0–0.2)
BASOPHILS NFR BLD AUTO: 0.7 % — SIGNIFICANT CHANGE UP (ref 0–2)
BILIRUB SERPL-MCNC: 0.6 MG/DL — SIGNIFICANT CHANGE UP (ref 0.2–1.2)
BUN SERPL-MCNC: 25 MG/DL — HIGH (ref 7–18)
CALCIUM SERPL-MCNC: 8.8 MG/DL — SIGNIFICANT CHANGE UP (ref 8.4–10.5)
CHLORIDE SERPL-SCNC: 110 MMOL/L — HIGH (ref 96–108)
CO2 SERPL-SCNC: 25 MMOL/L — SIGNIFICANT CHANGE UP (ref 22–31)
CREAT SERPL-MCNC: 1.4 MG/DL — HIGH (ref 0.5–1.3)
EOSINOPHIL # BLD AUTO: 0.19 K/UL — SIGNIFICANT CHANGE UP (ref 0–0.5)
EOSINOPHIL NFR BLD AUTO: 2.8 % — SIGNIFICANT CHANGE UP (ref 0–6)
GLUCOSE SERPL-MCNC: 186 MG/DL — HIGH (ref 70–99)
HCT VFR BLD CALC: 41.7 % — SIGNIFICANT CHANGE UP (ref 39–50)
HGB BLD-MCNC: 13.9 G/DL — SIGNIFICANT CHANGE UP (ref 13–17)
IMM GRANULOCYTES NFR BLD AUTO: 0.3 % — SIGNIFICANT CHANGE UP (ref 0–1.5)
LYMPHOCYTES # BLD AUTO: 2.45 K/UL — SIGNIFICANT CHANGE UP (ref 1–3.3)
LYMPHOCYTES # BLD AUTO: 36.6 % — SIGNIFICANT CHANGE UP (ref 13–44)
MCHC RBC-ENTMCNC: 27.8 PG — SIGNIFICANT CHANGE UP (ref 27–34)
MCHC RBC-ENTMCNC: 33.3 GM/DL — SIGNIFICANT CHANGE UP (ref 32–36)
MCV RBC AUTO: 83.4 FL — SIGNIFICANT CHANGE UP (ref 80–100)
MONOCYTES # BLD AUTO: 0.47 K/UL — SIGNIFICANT CHANGE UP (ref 0–0.9)
MONOCYTES NFR BLD AUTO: 7 % — SIGNIFICANT CHANGE UP (ref 2–14)
NEUTROPHILS # BLD AUTO: 3.52 K/UL — SIGNIFICANT CHANGE UP (ref 1.8–7.4)
NEUTROPHILS NFR BLD AUTO: 52.6 % — SIGNIFICANT CHANGE UP (ref 43–77)
NRBC # BLD: 0 /100 WBCS — SIGNIFICANT CHANGE UP (ref 0–0)
PLATELET # BLD AUTO: 157 K/UL — SIGNIFICANT CHANGE UP (ref 150–400)
POTASSIUM SERPL-MCNC: 3.6 MMOL/L — SIGNIFICANT CHANGE UP (ref 3.5–5.3)
POTASSIUM SERPL-SCNC: 3.6 MMOL/L — SIGNIFICANT CHANGE UP (ref 3.5–5.3)
PROT SERPL-MCNC: 6.9 G/DL — SIGNIFICANT CHANGE UP (ref 6–8.3)
RBC # BLD: 5 M/UL — SIGNIFICANT CHANGE UP (ref 4.2–5.8)
RBC # FLD: 13.6 % — SIGNIFICANT CHANGE UP (ref 10.3–14.5)
SODIUM SERPL-SCNC: 141 MMOL/L — SIGNIFICANT CHANGE UP (ref 135–145)
WBC # BLD: 6.7 K/UL — SIGNIFICANT CHANGE UP (ref 3.8–10.5)
WBC # FLD AUTO: 6.7 K/UL — SIGNIFICANT CHANGE UP (ref 3.8–10.5)

## 2019-03-11 PROCEDURE — 99233 SBSQ HOSP IP/OBS HIGH 50: CPT | Mod: GC

## 2019-03-11 PROCEDURE — 99222 1ST HOSP IP/OBS MODERATE 55: CPT

## 2019-03-11 RX ORDER — LANOLIN ALCOHOL/MO/W.PET/CERES
3 CREAM (GRAM) TOPICAL AT BEDTIME
Qty: 0 | Refills: 0 | Status: DISCONTINUED | OUTPATIENT
Start: 2019-03-11 | End: 2019-03-12

## 2019-03-11 RX ORDER — HALOPERIDOL DECANOATE 100 MG/ML
1 INJECTION INTRAMUSCULAR ONCE
Qty: 0 | Refills: 0 | Status: COMPLETED | OUTPATIENT
Start: 2019-03-11 | End: 2019-03-11

## 2019-03-11 RX ORDER — HALOPERIDOL DECANOATE 100 MG/ML
1 INJECTION INTRAMUSCULAR ONCE
Qty: 0 | Refills: 0 | Status: DISCONTINUED | OUTPATIENT
Start: 2019-03-11 | End: 2019-03-12

## 2019-03-11 RX ADMIN — Medication 3: at 21:28

## 2019-03-11 RX ADMIN — Medication 10 UNIT(S): at 17:12

## 2019-03-11 RX ADMIN — SENNA PLUS 2 TABLET(S): 8.6 TABLET ORAL at 21:27

## 2019-03-11 RX ADMIN — QUETIAPINE FUMARATE 50 MILLIGRAM(S): 200 TABLET, FILM COATED ORAL at 06:20

## 2019-03-11 RX ADMIN — ATORVASTATIN CALCIUM 40 MILLIGRAM(S): 80 TABLET, FILM COATED ORAL at 21:27

## 2019-03-11 RX ADMIN — Medication 100 MILLIGRAM(S): at 17:11

## 2019-03-11 RX ADMIN — Medication 81 MILLIGRAM(S): at 11:03

## 2019-03-11 RX ADMIN — HALOPERIDOL DECANOATE 1 MILLIGRAM(S): 100 INJECTION INTRAMUSCULAR at 20:47

## 2019-03-11 RX ADMIN — CARVEDILOL PHOSPHATE 25 MILLIGRAM(S): 80 CAPSULE, EXTENDED RELEASE ORAL at 06:21

## 2019-03-11 RX ADMIN — PANTOPRAZOLE SODIUM 40 MILLIGRAM(S): 20 TABLET, DELAYED RELEASE ORAL at 06:21

## 2019-03-11 RX ADMIN — CARVEDILOL PHOSPHATE 25 MILLIGRAM(S): 80 CAPSULE, EXTENDED RELEASE ORAL at 17:11

## 2019-03-11 RX ADMIN — Medication 10 MILLIGRAM(S): at 11:03

## 2019-03-11 RX ADMIN — QUETIAPINE FUMARATE 50 MILLIGRAM(S): 200 TABLET, FILM COATED ORAL at 17:11

## 2019-03-11 RX ADMIN — INSULIN GLARGINE 30 UNIT(S): 100 INJECTION, SOLUTION SUBCUTANEOUS at 21:27

## 2019-03-11 RX ADMIN — Medication 175 MICROGRAM(S): at 06:21

## 2019-03-11 RX ADMIN — Medication 3: at 17:11

## 2019-03-11 RX ADMIN — Medication 100 MILLIGRAM(S): at 06:20

## 2019-03-11 NOTE — CHART NOTE - NSCHARTNOTEFT_GEN_A_CORE
patient need to be off eliquis for 48 hours before EGD per GI. Discontinued Eliquis and resumed diet. NPO from midnight for EGD tomorrow.

## 2019-03-11 NOTE — CONSULT NOTE ADULT - PROBLEM SELECTOR RECOMMENDATION 2
Epigastric pain and heartburn  hx of GERD r/o ulcer  EGD when off AC for at least 48 hours. Last dose 3/10/19  c/w PPI Epigastric pain and heartburn  hx of GERD r/o ulcer  EGD when off AC for at least 48 hours. Last dose 3/10/19  NPO after MN  EGD tomorrow  c/w PPI

## 2019-03-11 NOTE — CHART NOTE - NSCHARTNOTEFT_GEN_A_CORE
Pt paged me and he is agitated. He pulled out his IV line. He is confused. Stat dose of 1mg IM was given.

## 2019-03-11 NOTE — PROGRESS NOTE ADULT - SUBJECTIVE AND OBJECTIVE BOX
PGY1 Note discussed with supervising resident and primary attending.    Patient is a 73y old  Male who presents with a chief complaint of Abdominal pain and vomiting (11 Mar 2019 11:05)      INTERVAL HPI/OVERNIGHT EVENTS:  pt seen and examined at bedside.  pt has mild epigastric abdominal pain.  No nausea or vomiting.  we are holding Eliquis for 48 hours for EGD tomorrow.  NPO from midnight.  Blood sugars are better controlled today with this am blood Sugar is 188.    MEDICATIONS  (STANDING):  aspirin  chewable 81 milliGRAM(s) Oral daily  atorvastatin 40 milliGRAM(s) Oral at bedtime  carvedilol 25 milliGRAM(s) Oral every 12 hours  dextrose 5%. 1000 milliLiter(s) (50 mL/Hr) IV Continuous <Continuous>  dextrose 50% Injectable 12.5 Gram(s) IV Push once  dextrose 50% Injectable 25 Gram(s) IV Push once  dextrose 50% Injectable 25 Gram(s) IV Push once  docusate sodium 100 milliGRAM(s) Oral two times a day  insulin glargine Injectable (LANTUS) 30 Unit(s) SubCutaneous at bedtime  insulin lispro (HumaLOG) corrective regimen sliding scale   SubCutaneous Before meals and at bedtime  insulin lispro Injectable (HumaLOG) 10 Unit(s) SubCutaneous three times a day before meals  levothyroxine 175 MICROGram(s) Oral daily  pantoprazole    Tablet 40 milliGRAM(s) Oral before breakfast  PARoxetine 10 milliGRAM(s) Oral daily  QUEtiapine 50 milliGRAM(s) Oral two times a day  senna 2 Tablet(s) Oral at bedtime  sodium chloride 0.9%. 1000 milliLiter(s) (100 mL/Hr) IV Continuous <Continuous>    MEDICATIONS  (PRN):  dextrose 40% Gel 15 Gram(s) Oral once PRN Blood Glucose LESS THAN 70 milliGRAM(s)/deciliter  glucagon  Injectable 1 milliGRAM(s) IntraMuscular once PRN Glucose LESS THAN 70 milligrams/deciliter  metoclopramide Injectable 10 milliGRAM(s) IV Push every 6 hours PRN vomiting      Allergies    No Known Allergies    Intolerances        REVIEW OF SYSTEMS:  CONSTITUTIONAL: No fever, weight loss, or fatigue  RESPIRATORY: No cough, wheezing, chills or hemoptysis; No shortness of breath  CARDIOVASCULAR: No chest pain, palpitations, dizziness, or leg swelling  GASTROINTESTINAL: No abdominal or epigastric pain. No nausea, vomiting, or hematemesis; No diarrhea or constipation. No melena or hematochezia.  NEUROLOGICAL: No headaches, memory loss, loss of strength, numbness, or tremors  SKIN: No itching, burning, rashes, or lesions     Vital Signs Last 24 Hrs  T(C): 36.6 (11 Mar 2019 08:19), Max: 36.8 (10 Mar 2019 15:54)  T(F): 97.8 (11 Mar 2019 08:19), Max: 98.2 (10 Mar 2019 15:54)  HR: 65 (11 Mar 2019 08:19) (64 - 70)  BP: 118/75 (11 Mar 2019 08:19) (118/75 - 152/73)  BP(mean): --  RR: 18 (11 Mar 2019 08:19) (18 - 18)  SpO2: 98% (11 Mar 2019 08:19) (95% - 98%)    PHYSICAL EXAM:  GENERAL: NAD, well-groomed, well-developed  HEAD:  Atraumatic, Normocephalic  EYES: EOMI, PERRLA, conjunctiva and sclera clear  NECK: Supple, No JVD, Normal thyroid  CHEST/LUNG: Clear to percussion bilaterally; No rales, rhonchi, wheezing, or rubs  HEART: Regular rate and rhythm; No murmurs, rubs, or gallops  ABDOMEN: mild tenderness in epigastric region. bowel sounds +ve.  NERVOUS SYSTEM:  Alert & Oriented X3, Good concentration; Motor Strength 5/5 B/L   EXTREMITIES:  2+ Peripheral Pulses, No clubbing, cyanosis, or edema      LABS:                        13.9   6.70  )-----------( 157      ( 11 Mar 2019 06:51 )             41.7     03-11    141  |  110<H>  |  25<H>  ----------------------------<  186<H>  3.6   |  25  |  1.40<H>    Ca    8.8      11 Mar 2019 06:51  Phos  3.4     03-10  Mg     2.3     03-10    TPro  6.9  /  Alb  3.3<L>  /  TBili  0.6  /  DBili  x   /  AST  15  /  ALT  24  /  AlkPhos  61  03-11        CAPILLARY BLOOD GLUCOSE      POCT Blood Glucose.: 198 mg/dL (11 Mar 2019 11:21)  POCT Blood Glucose.: 188 mg/dL (11 Mar 2019 08:38)  POCT Blood Glucose.: 233 mg/dL (10 Mar 2019 21:28)  POCT Blood Glucose.: 266 mg/dL (10 Mar 2019 16:55)  POCT Blood Glucose.: 360 mg/dL (10 Mar 2019 13:30)        Consultant(s) Notes Reviewed:  [x] YES  [ ] NO

## 2019-03-11 NOTE — PROGRESS NOTE ADULT - PROBLEM SELECTOR PLAN 1
resolved  p/w Intractable nausea and vomiting for 2 wks   CT Abdomen: No acute pathology   advanced diet to regular  on IV metoclopramide and protonix.  we are holding Eliquis for 48 hours for EGD tomorrow.  NPO from midnight.

## 2019-03-11 NOTE — CONSULT NOTE ADULT - SUBJECTIVE AND OBJECTIVE BOX
Patient is a 73y old  Male who presents with a chief complaint of Abdominal pain and vomiting (10 Mar 2019 10:52)    HPI: 73 Male with PMH of CVA x 3 (2014, 2015, 2016 on Elequis), Dementia, HTN, DM, GERD, Thyroidectomy leading to hypothyroidism came to hospital for nausea, vomiting and diffuse abdominal pain of 2 weeks. Admitted to medicine for uncontrolled diabetes and gastroparesis.        REVIEW OF SYSTEMS  Constitutional:   No fever, no fatigue, no pallor, no night sweats, no weight loss.  HEENT:   No eye pain, no vision changes, no icterus, no mouth ulcers.  Respiratory:   No shortness of breath, no cough, no respiratory distress.   Cardiovascular:   No chest pain, no palpitations.   Gastrointestinal: No abdominal pain, no nausea, no vomiting , no diarrhea, no constipation, no hematochezia, no melena.  Skin:   No rashes, no jaundice, no eczema.   Musculoskeletal:   No joint pain, no swelling, no myalgia.   Neurologic:   No headache, no seizure, no weakness.   Genitourinary:   No dysuria, no decreased urine output.  Psychiatric:  No depression, no anxiety,   Endocrine:   No thyroid disease, no diabetes.  Heme/Lymphatic:   No anemia, no blood transfusions, no lymph node enlargement, no bleeding, no bruising.  ___________________________________________________________________________________________  Allergies    No Known Allergies    Intolerances      MEDICATIONS  (STANDING):  apixaban 5 milliGRAM(s) Oral every 12 hours  aspirin  chewable 81 milliGRAM(s) Oral daily  atorvastatin 40 milliGRAM(s) Oral at bedtime  carvedilol 25 milliGRAM(s) Oral every 12 hours  dextrose 5%. 1000 milliLiter(s) (50 mL/Hr) IV Continuous <Continuous>  dextrose 50% Injectable 12.5 Gram(s) IV Push once  dextrose 50% Injectable 25 Gram(s) IV Push once  dextrose 50% Injectable 25 Gram(s) IV Push once  docusate sodium 100 milliGRAM(s) Oral two times a day  insulin glargine Injectable (LANTUS) 30 Unit(s) SubCutaneous at bedtime  insulin lispro (HumaLOG) corrective regimen sliding scale   SubCutaneous Before meals and at bedtime  insulin lispro Injectable (HumaLOG) 10 Unit(s) SubCutaneous three times a day before meals  levothyroxine 175 MICROGram(s) Oral daily  pantoprazole    Tablet 40 milliGRAM(s) Oral before breakfast  PARoxetine 10 milliGRAM(s) Oral daily  QUEtiapine 50 milliGRAM(s) Oral two times a day  senna 2 Tablet(s) Oral at bedtime  sodium chloride 0.9%. 1000 milliLiter(s) (100 mL/Hr) IV Continuous <Continuous>    MEDICATIONS  (PRN):  dextrose 40% Gel 15 Gram(s) Oral once PRN Blood Glucose LESS THAN 70 milliGRAM(s)/deciliter  glucagon  Injectable 1 milliGRAM(s) IntraMuscular once PRN Glucose LESS THAN 70 milligrams/deciliter  metoclopramide Injectable 10 milliGRAM(s) IV Push every 6 hours PRN vomiting      PAST MEDICAL & SURGICAL HISTORY:  Stroke: since january  History of skin cancer: removed from scalp  Lung nodule: As per CT scan of chest calcified granuloma  H/O hyperlipidemia  History of sleep apnea  History of gastroesophageal reflux (GERD)  History of thyroid cancer  Lumbar spinal stenosis  Diabetes mellitus  H/O: HTN (hypertension)  H/O total thyroidectomy: In 2007  S/P rotator cuff surgery: Left shoulder on 1/31/12.    FAMILY HISTORY:  No pertinent family history in first degree relatives    Social History: No hsitory of : Tobacco use, IVDA, EToH  ______________________________________________________________________________________    PHYSICAL EXAM    Daily     Daily   BMI: 40.7 (03-08 @ 23:08)  Change in Weight:  Vital Signs Last 24 Hrs  T(C): 36.6 (11 Mar 2019 08:19), Max: 36.8 (10 Mar 2019 15:54)  T(F): 97.8 (11 Mar 2019 08:19), Max: 98.2 (10 Mar 2019 15:54)  HR: 65 (11 Mar 2019 08:19) (64 - 70)  BP: 118/75 (11 Mar 2019 08:19) (118/75 - 152/73)  BP(mean): --  RR: 18 (11 Mar 2019 08:19) (18 - 18)  SpO2: 98% (11 Mar 2019 08:19) (95% - 98%)    General:  Well developed, obese, alert and active, no pallor, NAD.  HEENT:    Normal appearance of conjunctiva, ears, nose, lips, oropharynx, and oral mucosa, anicteric.  Neck:  No masses, no asymmetry.  Lymph Nodes:  No lymphadenopathy.   Cardiovascular:  RRR normal S1/S2, no murmur.  Respiratory:  CTA B/L, normal respiratory effort.   Abdominal:   soft, no masses or tenderness, normoactive BS, NT/ND, no HSM.  Extremities:   No clubbing or cyanosis, normal capillary refill, no edema.   Skin:   No rash, jaundice, lesions, eczema.   Musculoskeletal:  No joint swelling, erythema or tenderness.   Neuro: No focal deficits.   Other:   _______________________________________________________________________________________________  Lab Results:                          13.9   6.70  )-----------( 157      ( 11 Mar 2019 06:51 )             41.7     03-11    141  |  110<H>  |  25<H>  ----------------------------<  186<H>  3.6   |  25  |  1.40<H>    Ca    8.8      11 Mar 2019 06:51  Phos  3.4     03-10  Mg     2.3     03-10    TPro  6.9  /  Alb  3.3<L>  /  TBili  0.6  /  DBili  x   /  AST  15  /  ALT  24  /  AlkPhos  61  03-11    LIVER FUNCTIONS - ( 11 Mar 2019 06:51 )  Alb: 3.3 g/dL / Pro: 6.9 g/dL / ALK PHOS: 61 U/L / ALT: 24 U/L DA / AST: 15 U/L / GGT: x                   Stool Results:          RADIOLOGY RESULTS:    SURGICAL PATHOLOGY:

## 2019-03-11 NOTE — PROGRESS NOTE ADULT - PROBLEM SELECTOR PLAN 3
C/w ASA, Statin  for stroke x 3.  we are holding Eliquis for 48 hours for EGD tomorrow.  NPO from midnight.

## 2019-03-11 NOTE — PROGRESS NOTE ADULT - PROBLEM SELECTOR PLAN 2
Pt takes sometimes 40, sometimes 60 lantus at home  Non-compliant with insulin regimen and frequent finger sticks monitoring  on HSS and Lantus 30, humalog 10 tid.  sugar well controlled.

## 2019-03-11 NOTE — PROGRESS NOTE ADULT - ATTENDING COMMENTS
Agree with above, except for   Spoke to patient, he reports being compliant with medications, but reports that the insulin is very expensive for him.   Called patients wife: Genoveva. She reports that patient takes all his medications, except for insulin.   ROS and PE as above   Vital Signs Last 24 Hrs  T(C): 36.6 (11 Mar 2019 08:19), Max: 36.8 (10 Mar 2019 15:54)  T(F): 97.8 (11 Mar 2019 08:19), Max: 98.2 (10 Mar 2019 15:54)  HR: 65 (11 Mar 2019 08:19) (64 - 70)  BP: 118/75 (11 Mar 2019 08:19) (118/75 - 152/73)  BP(mean): --  RR: 18 (11 Mar 2019 08:19) (18 - 18)  SpO2: 98% (11 Mar 2019 08:19) (95% - 98%)    1. Uncontrolled DM: HBA1C 15. BG better controlled   c/w Lantus 30 un and Humalog 10 un TID   patient  is non complains with insulin, at the same time oral hypoglycemic unlikely will be beneficial   Left a message for  Dr. Zamudio - patients endocrinologist.     2. GERD vs ulcer: EGD postponed for tomorrow     The rest of the plan as above     Plan of care discussed with patient, his wife over the phone.
Agree with above, except for   patient seen and examined, c/o heartburn and epigastric pain. Nausea and vomiting resolved. BG slightly went down. DRV2N10.     ROS as above   PE: General; obese man, in distress secondary to heartburn   Cardio: Regular rate and rhythm   Pulm: clear breath sound   GI: distended abdomen, soft, BS (+)   Extr: no edema   Neuro: AOx3, no focal weakness     1. Gastroparesis: improved   2. Uncontrolled DM: HBA1C 15. -->308-->360  3. Epigastric pain and heartburn: concern got GERD vs ulcer   4. HTN   5. BETTY: improving     Plan   Advance diet to regular diet.   Increase Lantus to 30 and Humalog to 10 TID   Plan for EGD on AM. Keep NPO after midnight.   GI consult requested; Dr. Triana   c/w Pantoprazole   IV hydration   The rest as above

## 2019-03-11 NOTE — CONSULT NOTE ADULT - ASSESSMENT
GI asked to evaluate this 72 yo male with past medical hx as previously mentioned who p/w Intractable nausea and vomiting for 2 wks. Patient seen and examined lying in bed in NAD. States he continues with nausea, but denies abdominal pain, diarrhea, or constipation. CT Abdomen: No acute pathology. Patient denies having EGD/colonoscopy in the past.

## 2019-03-12 ENCOUNTER — TRANSCRIPTION ENCOUNTER (OUTPATIENT)
Age: 74
End: 2019-03-12

## 2019-03-12 ENCOUNTER — RESULT REVIEW (OUTPATIENT)
Age: 74
End: 2019-03-12

## 2019-03-12 VITALS
HEART RATE: 60 BPM | TEMPERATURE: 97 F | DIASTOLIC BLOOD PRESSURE: 81 MMHG | OXYGEN SATURATION: 100 % | RESPIRATION RATE: 17 BRPM | SYSTOLIC BLOOD PRESSURE: 121 MMHG

## 2019-03-12 LAB
ANION GAP SERPL CALC-SCNC: 5 MMOL/L — SIGNIFICANT CHANGE UP (ref 5–17)
BUN SERPL-MCNC: 26 MG/DL — HIGH (ref 7–18)
CALCIUM SERPL-MCNC: 8.5 MG/DL — SIGNIFICANT CHANGE UP (ref 8.4–10.5)
CHLORIDE SERPL-SCNC: 111 MMOL/L — HIGH (ref 96–108)
CO2 SERPL-SCNC: 26 MMOL/L — SIGNIFICANT CHANGE UP (ref 22–31)
CREAT SERPL-MCNC: 1.34 MG/DL — HIGH (ref 0.5–1.3)
GLUCOSE SERPL-MCNC: 194 MG/DL — HIGH (ref 70–99)
INR BLD: 1.12 RATIO — SIGNIFICANT CHANGE UP (ref 0.88–1.16)
POTASSIUM SERPL-MCNC: 3.6 MMOL/L — SIGNIFICANT CHANGE UP (ref 3.5–5.3)
POTASSIUM SERPL-SCNC: 3.6 MMOL/L — SIGNIFICANT CHANGE UP (ref 3.5–5.3)
PROTHROM AB SERPL-ACNC: 12.5 SEC — SIGNIFICANT CHANGE UP (ref 10–12.9)
SODIUM SERPL-SCNC: 142 MMOL/L — SIGNIFICANT CHANGE UP (ref 135–145)

## 2019-03-12 PROCEDURE — 88305 TISSUE EXAM BY PATHOLOGIST: CPT

## 2019-03-12 PROCEDURE — 82962 GLUCOSE BLOOD TEST: CPT

## 2019-03-12 PROCEDURE — 84484 ASSAY OF TROPONIN QUANT: CPT

## 2019-03-12 PROCEDURE — 43239 EGD BIOPSY SINGLE/MULTIPLE: CPT

## 2019-03-12 PROCEDURE — 88305 TISSUE EXAM BY PATHOLOGIST: CPT | Mod: 26

## 2019-03-12 PROCEDURE — 82607 VITAMIN B-12: CPT

## 2019-03-12 PROCEDURE — 86803 HEPATITIS C AB TEST: CPT

## 2019-03-12 PROCEDURE — 96374 THER/PROPH/DIAG INJ IV PUSH: CPT

## 2019-03-12 PROCEDURE — 82248 BILIRUBIN DIRECT: CPT

## 2019-03-12 PROCEDURE — 85610 PROTHROMBIN TIME: CPT

## 2019-03-12 PROCEDURE — 80053 COMPREHEN METABOLIC PANEL: CPT

## 2019-03-12 PROCEDURE — 83605 ASSAY OF LACTIC ACID: CPT

## 2019-03-12 PROCEDURE — 83735 ASSAY OF MAGNESIUM: CPT

## 2019-03-12 PROCEDURE — 74176 CT ABD & PELVIS W/O CONTRAST: CPT

## 2019-03-12 PROCEDURE — 83036 HEMOGLOBIN GLYCOSYLATED A1C: CPT

## 2019-03-12 PROCEDURE — 84436 ASSAY OF TOTAL THYROXINE: CPT

## 2019-03-12 PROCEDURE — 81001 URINALYSIS AUTO W/SCOPE: CPT

## 2019-03-12 PROCEDURE — 83690 ASSAY OF LIPASE: CPT

## 2019-03-12 PROCEDURE — 86900 BLOOD TYPING SEROLOGIC ABO: CPT

## 2019-03-12 PROCEDURE — 88312 SPECIAL STAINS GROUP 1: CPT | Mod: 26

## 2019-03-12 PROCEDURE — 87086 URINE CULTURE/COLONY COUNT: CPT

## 2019-03-12 PROCEDURE — 84443 ASSAY THYROID STIM HORMONE: CPT

## 2019-03-12 PROCEDURE — 80061 LIPID PANEL: CPT

## 2019-03-12 PROCEDURE — 80048 BASIC METABOLIC PNL TOTAL CA: CPT

## 2019-03-12 PROCEDURE — 86901 BLOOD TYPING SEROLOGIC RH(D): CPT

## 2019-03-12 PROCEDURE — 84100 ASSAY OF PHOSPHORUS: CPT

## 2019-03-12 PROCEDURE — 85027 COMPLETE CBC AUTOMATED: CPT

## 2019-03-12 PROCEDURE — 85730 THROMBOPLASTIN TIME PARTIAL: CPT

## 2019-03-12 PROCEDURE — 36415 COLL VENOUS BLD VENIPUNCTURE: CPT

## 2019-03-12 PROCEDURE — 82009 KETONE BODYS QUAL: CPT

## 2019-03-12 PROCEDURE — 93005 ELECTROCARDIOGRAM TRACING: CPT

## 2019-03-12 PROCEDURE — 99239 HOSP IP/OBS DSCHRG MGMT >30: CPT

## 2019-03-12 PROCEDURE — 86850 RBC ANTIBODY SCREEN: CPT

## 2019-03-12 PROCEDURE — 88312 SPECIAL STAINS GROUP 1: CPT

## 2019-03-12 PROCEDURE — 97161 PT EVAL LOW COMPLEX 20 MIN: CPT

## 2019-03-12 PROCEDURE — 99285 EMERGENCY DEPT VISIT HI MDM: CPT | Mod: 25

## 2019-03-12 RX ORDER — CARVEDILOL PHOSPHATE 80 MG/1
1 CAPSULE, EXTENDED RELEASE ORAL
Qty: 0 | Refills: 0 | COMMUNITY

## 2019-03-12 RX ORDER — EPLERENONE 50 MG/1
1 TABLET, FILM COATED ORAL
Qty: 0 | Refills: 0 | COMMUNITY

## 2019-03-12 RX ORDER — OLMESARTAN MEDOXOMIL 5 MG/1
1 TABLET, FILM COATED ORAL
Qty: 0 | Refills: 0 | COMMUNITY

## 2019-03-12 RX ORDER — INSULIN DETEMIR 100/ML (3)
30 INSULIN PEN (ML) SUBCUTANEOUS
Qty: 30 | Refills: 0 | OUTPATIENT
Start: 2019-03-12 | End: 2019-04-10

## 2019-03-12 RX ORDER — INSULIN LISPRO 100/ML
10 VIAL (ML) SUBCUTANEOUS
Qty: 90 | Refills: 0 | OUTPATIENT
Start: 2019-03-12 | End: 2019-04-10

## 2019-03-12 RX ADMIN — QUETIAPINE FUMARATE 50 MILLIGRAM(S): 200 TABLET, FILM COATED ORAL at 17:17

## 2019-03-12 RX ADMIN — Medication 10 UNIT(S): at 17:17

## 2019-03-12 RX ADMIN — Medication 175 MICROGRAM(S): at 05:59

## 2019-03-12 RX ADMIN — Medication 1: at 17:16

## 2019-03-12 RX ADMIN — Medication 100 MILLIGRAM(S): at 05:59

## 2019-03-12 RX ADMIN — QUETIAPINE FUMARATE 50 MILLIGRAM(S): 200 TABLET, FILM COATED ORAL at 05:59

## 2019-03-12 RX ADMIN — CARVEDILOL PHOSPHATE 25 MILLIGRAM(S): 80 CAPSULE, EXTENDED RELEASE ORAL at 17:17

## 2019-03-12 RX ADMIN — Medication 3 MILLIGRAM(S): at 00:19

## 2019-03-12 RX ADMIN — CARVEDILOL PHOSPHATE 25 MILLIGRAM(S): 80 CAPSULE, EXTENDED RELEASE ORAL at 05:59

## 2019-03-12 RX ADMIN — Medication 100 MILLIGRAM(S): at 17:17

## 2019-03-12 RX ADMIN — PANTOPRAZOLE SODIUM 40 MILLIGRAM(S): 20 TABLET, DELAYED RELEASE ORAL at 06:00

## 2019-03-12 RX ADMIN — Medication 1: at 09:04

## 2019-03-12 NOTE — DISCHARGE NOTE PROVIDER - CARE PROVIDER_API CALL
ZIGGY More)  Internal Medicine  [address] [place] [address]  Phone: [phone]  Fax: [phone]  Follow Up Time: ZIGGY More)  Internal Medicine  [address] [place] [address]  Phone: [phone]  Fax: [phone]  Follow Up Time:     Mandeep Maldonado)  Gastroenterology; Internal Medicine  [address] [place] [address]  Phone: [phone]  Fax: [phone]  Follow Up Time:

## 2019-03-12 NOTE — DISCHARGE NOTE NURSING/CASE MANAGEMENT/SOCIAL WORK - NSDCPEPTSTRK_GEN_ALL_CORE
Call 911 for stroke/Stroke support groups for patients, families, and friends/Need for follow up after discharge/Prescribed medications/Risk factors for stroke/Stroke education booklet/Stroke warning signs and symptoms/Signs and symptoms of stroke

## 2019-03-12 NOTE — DISCHARGE NOTE PROVIDER - NSDCCPCAREPLAN_GEN_ALL_CORE_FT
PRINCIPAL DISCHARGE DIAGNOSIS  Problem: Poorly controlled diabetes mellitus  Assessment and Plan of Treatment: PRINCIPAL DISCHARGE DIAGNOSIS  Problem: Poorly controlled diabetes mellitus  Assessment and Plan of Treatment:   you were admitted with uncontrolled diabetes mellitis which was causing gastrointestinal symptoms like nausea and vomiting. We started you on insulin lantus and humalog before meals. For nausea and vomiting we have started patient on 	IV metoclopramide. Pt symptoms resolved with better diabetic control. Sugar control was achieved on Lantus 30 units and 10 units of Humalog. we will continue that regime. We recommend you to have follow up with endocrinologist and regular follow up with your primary care physician. Now you are tolerating oral diet without any gastrointestinal symptoms.  You also had abdominal pain and found to have epigastric tenderness. We involved GI in care and they recommended endoscopy 48 hours after last dose of eliquis. Pt underwent EGD and found to have erosive gastritis , hiatal hernia and multiple biopsies were taken. you will take protonix 40 mg once daily for 30 days. you will follow up with gastroenterologist dr iglesias with results of biopsy.  pt is stable for discharge per primary attending.

## 2019-03-12 NOTE — CHART NOTE - NSCHARTNOTEFT_GEN_A_CORE
3/11/19, 11pm, Pt.'s confused, verbally abusive. Attempted to leave floor. Code Nirav was called. Fall precaution and enhanced supervision - initiated. Melatonin 3mg po QHS given for insomnia.

## 2019-03-12 NOTE — DISCHARGE NOTE NURSING/CASE MANAGEMENT/SOCIAL WORK - NSDCDPATPORTLINK_GEN_ALL_CORE
You can access the [facility] Patient Portal, offered by [facility], by registering with the following website: [email or web address]

## 2019-03-12 NOTE — DISCHARGE NOTE PROVIDER - HOSPITAL COURSE
73 Male with PMH of CVA x 3 (2014, 2015, 2016 on Elequis), Dementia, HTN, DM, GERD, Thyroidectomy leading to hypothyroidism came to hospital for nausea, vomiting and diffuse abdominal pain of 2 weeks. Pt is poor historian and is AO x 1. By using  he says he feels fine and he had abdominal pain previously. Information is taken from wife over the phone ([phone]). Wife has been  to him for 50 years and says it very difficult to deal with patient. He is very stubborn and doesn't let anyone take care of him. He adminiters is own insulin however he wants. Family has been asking him for 2 weeks to go to hospital for evaluation for his abdominal pain and intractable vomiting which he has been refusing. He doesn't want to see doctor and doesn't listen to the kids as well. However, he is complaint with his meds and sees his PMD regularly as per wife. Wife denied fever, chest pain, increased urination, diarrhea or sick contacts at home.         SH: Lives with wife, Quit binge drinking several years ago, Retired         ED Course: Hemodynamically stable. Labs showed finger stick of 600 and BETTY of 1.96 without anion gap acidosis. EKG showed normal sinus @ 74 bpm and Ct abdomen showed no acute pathology. Pt was given 2 liter bolus and zofran. 73 Male with PMH of CVA x 3 (2014, 2015, 2016 on Elequis), Dementia, HTN, DM, GERD, Thyroidectomy leading to hypothyroidism came to hospital for nausea, vomiting and diffuse abdominal pain of 2 weeks. Pt is poor historian and is AO x 1. By using  he says he feels fine and he had abdominal pain previously. Information is taken from wife over the phone ([phone]). Wife has been  to him for 50 years and says it very difficult to deal with patient. He is very stubborn and doesn't let anyone take care of him. He adminiters is own insulin however he wants. Family has been asking him for 2 weeks to go to hospital for evaluation for his abdominal pain and intractable vomiting which he has been refusing. He doesn't want to see doctor and doesn't listen to the kids as well. However, he is complaint with his meds and sees his PMD regularly as per wife. Wife denied fever, chest pain, increased urination, diarrhea or sick contacts at home.         SH: Lives with wife, Quit binge drinking several years ago, Retired         ED Course: Hemodynamically stable. Labs showed finger stick of 600 and BETTY of 1.96 without anion gap acidosis. EKG showed normal sinus @ 74 bpm and Ct abdomen showed no acute pathology. Pt was given 2 liter bolus and zofran.         you were admitted with uncontrolled diabetes mellitis which was causing gastrointestinal symptoms like nausea and vomiting. We started you on insulin lantus and humalog before meals. For nausea and vomiting we have started patient on 	IV metoclopramide. Pt symptoms resolved with better diabetic control. Sugar control was achieved on Lantus 30 units and 10 units of Humalog. we will continue that regime. We recommend you to have follow up with endocrinologist and regular follow up with your primary care physician. Now you are tolerating oral diet without any gastrointestinal symptoms.    You also had abdominal pain and found to have epigastric tenderness. We involved GI in care and they recommended endoscopy 48 hours after last dose of eliquis. Pt underwent EGD and found to have erosive gastritis , hiatal hernia and multiple biopsies were taken. you will take protonix 40 mg once daily for 30 days. you will follow up with gastroenterologist dr iglesias with results of biopsy.    pt is stable for discharge per primary attending.

## 2019-03-12 NOTE — CHART NOTE - NSCHARTNOTEFT_GEN_A_CORE
Seen and examined at bedside. No new complains  BG better controlled.   Had EGD today; found to have gastritis and hiatal hernia. Multiple biopsies taken.   Patient is medically stable for discharge. Plan of discharge discussed with patient and his wife over the phone.   Spoke to  Dr. Moffett - [facility] endocrinologist.    Vital signs and labs reviewed.     Vital Signs Last 24 Hrs  T(C): 36.2 (12 Mar 2019 15:59), Max: 36.5 (12 Mar 2019 05:07)  T(F): 97.2 (12 Mar 2019 15:59), Max: 97.7 (12 Mar 2019 05:07)  HR: 60 (12 Mar 2019 15:59) (58 - 63)  BP: 121/81 (12 Mar 2019 15:59) (121/81 - 143/54)  BP(mean): --  RR: 17 (12 Mar 2019 15:59) (17 - 17)  SpO2: 100% (12 Mar 2019 15:59) (98% - 100%)    1. Uncontrolled DM; to be discharged on Levemir 30 units and Humalog 10 un TID   2. Gastritis: c/w Pantoprazole 40 mg po daily    3. HTN: BP controlled on Carvedilol   4. Hypothyroidism; c/w Levothyroxine 175 mcg po daily     Offered patient nursing home visits, but patient refused.

## 2019-03-12 NOTE — DISCHARGE NOTE PROVIDER - CARE PROVIDERS DIRECT ADDRESSES
[email or web address]

## 2019-03-12 NOTE — DISCHARGE NOTE PROVIDER - PROVIDER TOKENS
PROVIDER:[TOKEN:[[id number]]] PROVIDER:[TOKEN:[[id number]]],PROVIDER:[TOKEN:[[id number]]]

## 2021-05-05 ENCOUNTER — RESULT REVIEW (OUTPATIENT)
Age: 76
End: 2021-05-05

## 2023-12-21 NOTE — ED ADULT TRIAGE NOTE - MODE OF ARRIVAL
THANK YOU FOR THE REFERRAL. WRITER REACHED OUT TO RN TO MEET WITH PATIENT/FAMILY AND EVALUATE. WITH ANY FURTHER QUESTIONS OR CONCERNS PLEASE CALL [phone].  
Q03/EMS

## 2025-02-02 NOTE — PHYSICAL THERAPY INITIAL EVALUATION ADULT - DISCHARGE DISPOSITION, PT EVAL
Subjective:     Patient ID: Connor Morgan Santiago  MD Herminio;  [facility]  Service: Endoscopy;  Laterality: N/A;    COLONOSCOPY N/A 9/27/2019    Procedure: COLONOSCOPY;  Surgeon: Santiago Durham MD;  [facility]  Service: Endoscopy;  Laterality: N/A;  labs prior / s/p liver transplant-MS    COLONOSCOPY N/A 3/21/2023    Procedure: COLONOSCOPY;  Surgeon: Rolando Wallace Jr., MD;  Location: [facility] ENDO;  [employer]boat, 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 Originally from [place] [facility], displaced by [place], was living in [place].     home/Patient informed about discharge home with PT services but patient deferred. Vlad Poe DPM